# Patient Record
Sex: MALE | Race: WHITE | Employment: FULL TIME | URBAN - METROPOLITAN AREA
[De-identification: names, ages, dates, MRNs, and addresses within clinical notes are randomized per-mention and may not be internally consistent; named-entity substitution may affect disease eponyms.]

---

## 2022-11-01 ENCOUNTER — OFFICE VISIT (OUTPATIENT)
Dept: INTERNAL MEDICINE | Age: 32
End: 2022-11-01
Payer: COMMERCIAL

## 2022-11-01 VITALS
RESPIRATION RATE: 16 BRPM | BODY MASS INDEX: 29.41 KG/M2 | OXYGEN SATURATION: 99 % | HEART RATE: 94 BPM | SYSTOLIC BLOOD PRESSURE: 102 MMHG | WEIGHT: 166 LBS | DIASTOLIC BLOOD PRESSURE: 62 MMHG | HEIGHT: 63 IN | TEMPERATURE: 97.9 F

## 2022-11-01 DIAGNOSIS — B34.9 VIRAL ILLNESS: Primary | ICD-10-CM

## 2022-11-01 LAB
INFLUENZA A ANTIBODY: NORMAL
INFLUENZA B ANTIBODY: NORMAL
RSV ANTIGEN: NORMAL

## 2022-11-01 PROCEDURE — 87804 INFLUENZA ASSAY W/OPTIC: CPT | Performed by: PHYSICIAN ASSISTANT

## 2022-11-01 PROCEDURE — 99203 OFFICE O/P NEW LOW 30 MIN: CPT | Performed by: PHYSICIAN ASSISTANT

## 2022-11-01 PROCEDURE — 86756 RESPIRATORY VIRUS ANTIBODY: CPT | Performed by: PHYSICIAN ASSISTANT

## 2022-11-01 SDOH — ECONOMIC STABILITY: FOOD INSECURITY: WITHIN THE PAST 12 MONTHS, YOU WORRIED THAT YOUR FOOD WOULD RUN OUT BEFORE YOU GOT MONEY TO BUY MORE.: NEVER TRUE

## 2022-11-01 SDOH — ECONOMIC STABILITY: FOOD INSECURITY: WITHIN THE PAST 12 MONTHS, THE FOOD YOU BOUGHT JUST DIDN'T LAST AND YOU DIDN'T HAVE MONEY TO GET MORE.: NEVER TRUE

## 2022-11-01 ASSESSMENT — ENCOUNTER SYMPTOMS
RHINORRHEA: 0
COUGH: 1
SORE THROAT: 0
SINUS PAIN: 0
WHEEZING: 0
SHORTNESS OF BREATH: 0

## 2022-11-01 ASSESSMENT — PATIENT HEALTH QUESTIONNAIRE - PHQ9
SUM OF ALL RESPONSES TO PHQ9 QUESTIONS 1 & 2: 0
SUM OF ALL RESPONSES TO PHQ QUESTIONS 1-9: 0
1. LITTLE INTEREST OR PLEASURE IN DOING THINGS: 0
SUM OF ALL RESPONSES TO PHQ QUESTIONS 1-9: 0
2. FEELING DOWN, DEPRESSED OR HOPELESS: 0

## 2022-11-01 ASSESSMENT — SOCIAL DETERMINANTS OF HEALTH (SDOH): HOW HARD IS IT FOR YOU TO PAY FOR THE VERY BASICS LIKE FOOD, HOUSING, MEDICAL CARE, AND HEATING?: NOT HARD AT ALL

## 2022-11-01 NOTE — PROGRESS NOTES
22    Nubia Smith (: 1990) is a 28 y.o. male, New patient, here for evaluation of the following chief complaint(s):  New Patient and Headache (Lasted for the last 5 days coughing swollen lymphnodes, fever off and on 102)      HPI    Fever , HA and swollen lymph nodes x 5 days  States fever on and off, about twice daily   Up to 102 degrees  Cough, but no sore throat   Left sided neck pain, with the swollen nodes   Feels much better today , without the use of medications    Covid at home test x 3- negative   Kids have RSV         Review of Systems   Constitutional:  Positive for fever. HENT:  Negative for congestion, ear pain, postnasal drip, rhinorrhea, sinus pain and sore throat. Respiratory:  Positive for cough. Negative for shortness of breath and wheezing. Cardiovascular: Negative. Genitourinary:  Negative for decreased urine volume. Musculoskeletal:  Positive for neck pain (left sided). Negative for neck stiffness. Neurological:  Positive for headaches. Negative for dizziness, weakness and numbness. Prior to Visit Medications    Not on File        Allergies   Allergen Reactions    Pregabalin      Other reaction(s): Other: See Comments  Severe head aches.     Morphine Rash     Headcahes       Social History     Socioeconomic History    Marital status:      Spouse name: Not on file    Number of children: Not on file    Years of education: Not on file    Highest education level: Not on file   Occupational History    Not on file   Tobacco Use    Smoking status: Former     Packs/day: 1.00     Types: Cigarettes     Start date: 10/1/2008     Quit date: 10/25/2022     Years since quittin.0    Smokeless tobacco: Never   Substance and Sexual Activity    Alcohol use: Yes     Comment: occasional    Drug use: Never    Sexual activity: Not on file   Other Topics Concern    Not on file   Social History Narrative    Not on file     Social Determinants of Health     Financial Resource Strain: Low Risk     Difficulty of Paying Living Expenses: Not hard at all   Food Insecurity: No Food Insecurity    Worried About Running Out of Food in the Last Year: Never true    Ran Out of Food in the Last Year: Never true   Transportation Needs: Not on file   Physical Activity: Not on file   Stress: Not on file   Social Connections: Not on file   Intimate Partner Violence: Not on file   Housing Stability: Not on file       Vitals:    11/01/22 1426   BP: 102/62   Site: Left Upper Arm   Position: Sitting   Cuff Size: Medium Adult   Pulse: 94   Resp: 16   Temp: 97.9 °F (36.6 °C)   SpO2: 99%   Weight: 166 lb (75.3 kg)   Height: 5' 3\" (1.6 m)        Physical Exam  Vitals reviewed. Constitutional:       Appearance: Normal appearance. HENT:      Head: Normocephalic and atraumatic. Right Ear: Tympanic membrane normal.      Left Ear: Tympanic membrane normal.   Eyes:      Extraocular Movements: Extraocular movements intact. Conjunctiva/sclera: Conjunctivae normal.      Pupils: Pupils are equal, round, and reactive to light. Cardiovascular:      Rate and Rhythm: Normal rate and regular rhythm. Heart sounds: Normal heart sounds. Pulmonary:      Effort: Pulmonary effort is normal.      Breath sounds: Normal breath sounds. Abdominal:      General: Bowel sounds are normal.      Palpations: Abdomen is soft. Musculoskeletal:      Cervical back: Normal range of motion. Lymphadenopathy:      Cervical: Cervical adenopathy present. Neurological:      Mental Status: He is alert. ASSESSMENT/PLAN:  1. Viral illness  - POCT RSV- neg   - POCT Influenza A/B- neg   - getting better   - rest and fluids              No follow-ups on file. An  electronic signature was used to authenticate this note.     Electronically signed by HARMAN Ruiz on 11/1/2022 at 2:45 PM

## 2022-12-30 ENCOUNTER — OFFICE VISIT (OUTPATIENT)
Dept: INTERNAL MEDICINE | Age: 32
End: 2022-12-30

## 2022-12-30 VITALS
TEMPERATURE: 97.8 F | BODY MASS INDEX: 29.23 KG/M2 | WEIGHT: 165 LBS | DIASTOLIC BLOOD PRESSURE: 80 MMHG | RESPIRATION RATE: 18 BRPM | SYSTOLIC BLOOD PRESSURE: 128 MMHG | HEIGHT: 63 IN | OXYGEN SATURATION: 98 % | HEART RATE: 100 BPM

## 2022-12-30 DIAGNOSIS — J40 BRONCHITIS: Primary | ICD-10-CM

## 2022-12-30 RX ORDER — AZITHROMYCIN 250 MG/1
250 TABLET, FILM COATED ORAL SEE ADMIN INSTRUCTIONS
Qty: 6 TABLET | Refills: 0 | Status: SHIPPED | OUTPATIENT
Start: 2022-12-30 | End: 2023-01-04

## 2022-12-30 RX ORDER — METHYLPREDNISOLONE 4 MG/1
TABLET ORAL
Qty: 1 KIT | Refills: 0 | Status: SHIPPED | OUTPATIENT
Start: 2022-12-30 | End: 2023-01-05

## 2022-12-30 RX ORDER — ALBUTEROL SULFATE 90 UG/1
2 AEROSOL, METERED RESPIRATORY (INHALATION) 4 TIMES DAILY PRN
Qty: 18 G | Refills: 0 | Status: SHIPPED | OUTPATIENT
Start: 2022-12-30

## 2022-12-30 RX ORDER — BENZONATATE 100 MG/1
100 CAPSULE ORAL 3 TIMES DAILY PRN
Qty: 21 CAPSULE | Refills: 0 | Status: SHIPPED | OUTPATIENT
Start: 2022-12-30 | End: 2023-01-06

## 2022-12-30 ASSESSMENT — ENCOUNTER SYMPTOMS
RHINORRHEA: 1
TROUBLE SWALLOWING: 0
SINUS PRESSURE: 0
COUGH: 1
WHEEZING: 1
SORE THROAT: 1

## 2022-12-30 NOTE — PROGRESS NOTES
[facility] Encounter    SUBJECTIVE    CHIEF COMPLAINT:   Chief Complaint   Patient presents with    Illness     Patient started 3 weeks ago with sore throat, cough, and congestion. Patient kids had RSV a month ago. HPI:  Joao Carreon is a 28 y.o. male who presents to the walk-in clinic today for:     Cough  This is a new problem. Episode onset: x3 weeks. The problem has been unchanged. The cough is Productive of purulent sputum. Associated symptoms include rhinorrhea, a sore throat and wheezing. Pertinent negatives include no chest pain, ear pain, fever, headaches, myalgias or rash. Nothing aggravates the symptoms. Treatments tried: mucinex, motrin. The treatment provided mild relief. There is no history of asthma, bronchitis or COPD. + smoker . Family was sick at home during onset of symptoms with RSV. His symptoms have been lingering since. Past Medical History:   Diagnosis Date    ADHD (attention deficit hyperactivity disorder)     Chronic back pain        No current outpatient medications on file prior to visit. No current facility-administered medications on file prior to visit.        Family History   Problem Relation Age of Onset    Heart Attack Maternal Grandfather        Social History     Socioeconomic History    Marital status:      Spouse name: Not on file    Number of children: Not on file    Years of education: Not on file    Highest education level: Not on file   Occupational History    Not on file   Tobacco Use    Smoking status: Former     Packs/day: 1.00     Types: Cigarettes     Start date: 10/1/2008     Quit date: 10/25/2022     Years since quittin.1    Smokeless tobacco: Never   Substance and Sexual Activity    Alcohol use: Yes     Comment: occasional    Drug use: Never    Sexual activity: Not on file   Other Topics Concern    Not on file   Social History Narrative    Not on file     Social Determinants of Health Financial Resource Strain: Low Risk     Difficulty of Paying Living Expenses: Not hard at all   Food Insecurity: No Food Insecurity    Worried About Running Out of Food in the Last Year: Never true    Ran Out of Food in the Last Year: Never true   Transportation Needs: Not on file   Physical Activity: Not on file   Stress: Not on file   Social Connections: Not on file   Intimate Partner Violence: Not on file   Housing Stability: Not on file       Allergies   Allergen Reactions    Pregabalin      Other reaction(s): Other: See Comments  Severe head aches. Morphine Rash     Headcahes       Review of Systems   Constitutional:  Negative for fatigue and fever. HENT:  Positive for rhinorrhea and sore throat. Negative for congestion, ear pain, sinus pressure, sneezing and trouble swallowing. Respiratory:  Positive for cough and wheezing. Cardiovascular:  Negative for chest pain and palpitations. Musculoskeletal:  Negative for arthralgias and myalgias. Skin:  Negative for pallor and rash. Neurological:  Negative for headaches. Psychiatric/Behavioral:  Positive for sleep disturbance. OBJECTIVE:  VITALS:  /80 (Site: Left Upper Arm, Position: Sitting, Cuff Size: Small Adult)   Pulse 100   Temp 97.8 °F (36.6 °C) (Temporal)   Resp 18   Ht 5' 3\" (1.6 m)   Wt 165 lb (74.8 kg)   SpO2 98%   BMI 29.23 kg/m²     Physical Exam  Vitals reviewed. Constitutional:       Appearance: Normal appearance. He is not ill-appearing. HENT:      Head: Normocephalic. Right Ear: Tympanic membrane, ear canal and external ear normal. There is no impacted cerumen. Left Ear: Tympanic membrane, ear canal and external ear normal. There is no impacted cerumen. Nose: No congestion. Mouth/Throat:      Mouth: Mucous membranes are moist.      Pharynx: Oropharynx is clear. Posterior oropharyngeal erythema present. No oropharyngeal exudate.    Cardiovascular:      Rate and Rhythm: Normal rate and regular rhythm. Pulses: Normal pulses. Heart sounds: Normal heart sounds. No murmur heard. No friction rub. No gallop. Pulmonary:      Effort: Pulmonary effort is normal.      Breath sounds: Wheezing present. Musculoskeletal:      Cervical back: Normal range of motion and neck supple. No rigidity. Lymphadenopathy:      Cervical: No cervical adenopathy. Skin:     General: Skin is warm and dry. Coloration: Skin is not jaundiced or pale. Findings: No rash. Neurological:      Mental Status: He is alert and oriented to person, place, and time. ASSESSMENT/PLAN:    1. Bronchitis  - methylPREDNISolone (MEDROL DOSEPACK) 4 MG tablet; Take by mouth. Dispense: 1 kit; Refill: 0  - benzonatate (TESSALON) 100 MG capsule; Take 1 capsule by mouth 3 times daily as needed for Cough  Dispense: 21 capsule; Refill: 0  - albuterol sulfate HFA (VENTOLIN HFA) 108 (90 Base) MCG/ACT inhaler; Inhale 2 puffs into the lungs 4 times daily as needed for Wheezing  Dispense: 18 g; Refill: 0  - azithromycin (ZITHROMAX) 250 MG tablet; Take 1 tablet by mouth See Admin Instructions for 5 days 500mg on day 1 followed by 250mg on days 2 - 5  Dispense: 6 tablet; Refill: 0  - OTC tylenol/motrin for pain/fevers  - Increase fluids + rest    LABS:  No results found for this visit on 12/30/22. Return in about 2 weeks (around 1/13/2023) for reassessment with PCP. Follow up with PCP to evaluate treatment results or return if symptoms worsen or fail to improve. Discussed signs and symptoms which require immediate follow-up in ED/call to 911. Understanding verbalized. All prescribed medication today including purpose, proper use, and side effects discussed. Treatment plan/rationale and result expectations have been discussed with the patient who expresses understanding and desires to proceed. An electronic signature was used to authenticate this note.   ARTHUR Motta - CNP

## 2023-02-09 ENCOUNTER — HOSPITAL ENCOUNTER (EMERGENCY)
Age: 33
Discharge: HOME | End: 2023-02-09
Payer: COMMERCIAL

## 2023-02-09 VITALS
WEIGHT: 165 LBS | TEMPERATURE: 98 F | OXYGEN SATURATION: 100 % | RESPIRATION RATE: 16 BRPM | BODY MASS INDEX: 29.23 KG/M2 | HEART RATE: 95 BPM | SYSTOLIC BLOOD PRESSURE: 120 MMHG | DIASTOLIC BLOOD PRESSURE: 77 MMHG

## 2023-02-09 DIAGNOSIS — H66.001 ACUTE SUPPURATIVE OTITIS MEDIA OF RIGHT EAR WITHOUT SPONTANEOUS RUPTURE OF TYMPANIC MEMBRANE, RECURRENCE NOT SPECIFIED: Primary | ICD-10-CM

## 2023-02-09 LAB
INFLUENZA A BY PCR: NEGATIVE
INFLUENZA B BY PCR: NEGATIVE
SARS-COV-2, NAAT: NOT DETECTED

## 2023-02-09 PROCEDURE — 96372 THER/PROPH/DIAG INJ SC/IM: CPT

## 2023-02-09 PROCEDURE — 87502 INFLUENZA DNA AMP PROBE: CPT

## 2023-02-09 PROCEDURE — 99284 EMERGENCY DEPT VISIT MOD MDM: CPT

## 2023-02-09 PROCEDURE — 6360000002 HC RX W HCPCS

## 2023-02-09 PROCEDURE — 6370000000 HC RX 637 (ALT 250 FOR IP)

## 2023-02-09 PROCEDURE — 87635 SARS-COV-2 COVID-19 AMP PRB: CPT

## 2023-02-09 RX ORDER — KETOROLAC TROMETHAMINE 30 MG/ML
30 INJECTION, SOLUTION INTRAMUSCULAR; INTRAVENOUS ONCE
Status: COMPLETED | OUTPATIENT
Start: 2023-02-09 | End: 2023-02-09

## 2023-02-09 RX ORDER — AMOXICILLIN AND CLAVULANATE POTASSIUM 875; 125 MG/1; MG/1
1 TABLET, FILM COATED ORAL EVERY 12 HOURS SCHEDULED
Status: DISCONTINUED | OUTPATIENT
Start: 2023-02-09 | End: 2023-02-09

## 2023-02-09 RX ORDER — HYDROCODONE BITARTRATE AND ACETAMINOPHEN 5; 325 MG/1; MG/1
1 TABLET ORAL ONCE
Status: COMPLETED | OUTPATIENT
Start: 2023-02-09 | End: 2023-02-09

## 2023-02-09 RX ORDER — AMOXICILLIN AND CLAVULANATE POTASSIUM 875; 125 MG/1; MG/1
1 TABLET, FILM COATED ORAL 2 TIMES DAILY
Qty: 20 TABLET | Refills: 0 | Status: SHIPPED | OUTPATIENT
Start: 2023-02-09 | End: 2023-02-19

## 2023-02-09 RX ORDER — AMOXICILLIN AND CLAVULANATE POTASSIUM 875; 125 MG/1; MG/1
1 TABLET, FILM COATED ORAL ONCE
Status: COMPLETED | OUTPATIENT
Start: 2023-02-09 | End: 2023-02-09

## 2023-02-09 RX ORDER — IBUPROFEN 600 MG/1
600 TABLET ORAL EVERY 8 HOURS PRN
Qty: 20 TABLET | Refills: 0 | Status: SHIPPED | OUTPATIENT
Start: 2023-02-09

## 2023-02-09 RX ADMIN — AMOXICILLIN AND CLAVULANATE POTASSIUM 1 TABLET: 875; 125 TABLET, FILM COATED ORAL at 19:06

## 2023-02-09 RX ADMIN — KETOROLAC TROMETHAMINE 30 MG: 30 INJECTION, SOLUTION INTRAMUSCULAR at 19:06

## 2023-02-09 RX ADMIN — HYDROCODONE BITARTRATE AND ACETAMINOPHEN 1 TABLET: 5; 325 TABLET ORAL at 19:35

## 2023-02-09 ASSESSMENT — ENCOUNTER SYMPTOMS
NAUSEA: 0
VOMITING: 0
COUGH: 0
DIARRHEA: 0
SORE THROAT: 0
BACK PAIN: 0
ABDOMINAL PAIN: 0
SHORTNESS OF BREATH: 0

## 2023-02-09 ASSESSMENT — PAIN SCALES - GENERAL: PAINLEVEL_OUTOF10: 9

## 2023-02-09 ASSESSMENT — PAIN - FUNCTIONAL ASSESSMENT: PAIN_FUNCTIONAL_ASSESSMENT: 0-10

## 2023-02-09 ASSESSMENT — PAIN DESCRIPTION - ORIENTATION: ORIENTATION: RIGHT

## 2023-02-09 NOTE — Clinical Note
Donta Rosen was seen and treated in our emergency department on 2/9/2023. He may return to work on 02/11/2023. If you have any questions or concerns, please don't hesitate to call.       Roshan Benitez, APRN - CNP

## 2023-02-09 NOTE — ED PROVIDER NOTES
[address] [place] [address] ED  eMERGENCYdEPARTMENT eNCOUnter      Pt Name: Bandar Johnson  MRN: [id number]  Vandanagfconor 1990of evaluation: 2/9/2023  [address] [place] [address]    CHIEF COMPLAINT       Chief Complaint   Patient presents with    Otalgia     C/o right ear pain x 1 day           HISTORY OF PRESENT ILLNESS  (Location/Symptom, Timing/Onset, Context/Setting, Quality, Duration, Modifying Factors, Severity.)   Bandar Johnson is a 28 y.o. male hx of ADHD and smoker who presents to the emergency department with otalgia x 2 days. Patient complains of right ear ache 9/10 unrelieved by OTC Motrin x 2 days. Denies any CP, SOB, fever, chills, recent illness, nausea, emesis, abdominal pain headache. HPI    Nursing Notes were reviewed and I agree. REVIEW OF SYSTEMS    (2-9 systems for level 4, 10 or more for level 5)     Review of Systems   Constitutional:  Negative for activity change, chills and fever. HENT:  Positive for ear pain (right ear pain). Negative for sore throat. Eyes:  Negative for visual disturbance. Respiratory:  Negative for cough and shortness of breath. Cardiovascular:  Negative for chest pain, palpitations and leg swelling. Gastrointestinal:  Negative for abdominal pain, diarrhea, nausea and vomiting. Genitourinary:  Negative for dysuria. Musculoskeletal:  Negative for back pain. Skin:  Negative for rash. Neurological:  Negative for dizziness and weakness. as noted above the remainder of the review of systems was reviewed and negative.        PAST MEDICAL HISTORY     Past Medical History:   Diagnosis Date    ADHD (attention deficit hyperactivity disorder)     Chronic back pain          SURGICAL HISTORY       Past Surgical History:   Procedure Laterality Date    APPENDECTOMY      BACK SURGERY           CURRENT MEDICATIONS       Previous Medications    ALBUTEROL SULFATE HFA (VENTOLIN HFA) 108 (90 BASE) MCG/ACT INHALER    Inhale 2 puffs into the lungs 4 times daily as needed for Wheezing       ALLERGIES     Pregabalin and Morphine    HISTORY       Family History   Problem Relation Age of Onset    Heart Attack Maternal Grandfather           SOCIAL HISTORY       Social History     Socioeconomic History    Marital status:    Tobacco Use    Smoking status: Former     Packs/day: 1.00     Types: Cigarettes     Start date: 10/1/2008     Quit date: 10/25/2022     Years since quittin.2    Smokeless tobacco: Never   Substance and Sexual Activity    Alcohol use: Yes     Comment: occasional    Drug use: Never     Social Determinants of Health     Financial Resource Strain: Low Risk     Difficulty of Paying Living Expenses: Not hard at all   Food Insecurity: No Food Insecurity    Worried About [employer] in the Last Year: Never true    Ran Out of Food in the Last Year: Never true       SCREENINGS    [place] Coma Scale  Eye Opening: Spontaneous  Best Verbal Response: Oriented  Best Motor Response: Obeys commands  [place] Coma Scale Score: 15      PHYSICAL EXAM    (up to 7 forlevel 4, 8 or more for level 5)     ED Triage Vitals [23 1758]   BP Temp Temp src Heart Rate Resp SpO2 Height Weight   (!) 142/95 -- -- 93 18 99 % -- --       Physical Exam  Vitals and nursing note reviewed. Constitutional:       General: He is not in acute distress. Appearance: He is well-developed. He is not ill-appearing. HENT:      Head: Normocephalic. Right Ear: External ear normal. Tenderness present. Tympanic membrane is erythematous and bulging. Left Ear: Hearing, tympanic membrane, ear canal and external ear normal.      Nose: Nose normal. No congestion or rhinorrhea. Mouth/Throat:      Mouth: Mucous membranes are moist.      Pharynx: No oropharyngeal exudate or posterior oropharyngeal erythema. Eyes:      Conjunctiva/sclera: Conjunctivae normal.      Pupils: Pupils are equal, round, and reactive to light.    Cardiovascular:      Rate and Rhythm: Normal rate and regular rhythm. Heart sounds: Normal heart sounds. Pulmonary:      Effort: Pulmonary effort is normal.      Breath sounds: Normal breath sounds. No wheezing or rhonchi. Abdominal:      General: Bowel sounds are normal. There is no distension. Palpations: Abdomen is soft. Tenderness: There is no abdominal tenderness. Musculoskeletal:         General: Normal range of motion. Cervical back: Normal range of motion and neck supple. Lymphadenopathy:      Cervical: No cervical adenopathy. Skin:     General: Skin is warm and dry. Capillary Refill: Capillary refill takes less than 2 seconds. Neurological:      General: No focal deficit present. Mental Status: He is alert and oriented to person, place, and time. Mental status is at baseline. Psychiatric:         Mood and Affect: Mood normal.         Behavior: Behavior normal.         Thought Content: Thought content normal.         Judgment: Judgment normal.         DIAGNOSTIC RESULTS     RADIOLOGY:   Non-plain film images such as CT, Ultrasound and MRI are read by the radiologist. Plain radiographic images are visualized and preliminarilyinterpreted by ARTHUR Flynn CNP with the below findings:        Interpretation per the Radiologist below, if available at the time of this note:    No orders to display       LABS:  Labs Reviewed   COVID-19, RAPID   RAPID INFLUENZA A/B ANTIGENS       All other labs were within normal range or not returnedas of this dictation.     EMERGENCYDEPARTMENT COURSE and DIFFERENTIAL DIAGNOSIS/MDM:   Vitals:    Vitals:    02/09/23 1758 02/09/23 1907 02/09/23 1912   BP: (!) 142/95  120/77   Pulse: 93  95   Resp: 18  16   Temp:  98 °F (36.7 °C)    SpO2: 99%  100%   Weight:  165 lb (74.8 kg)        REASSESSMENT            MDM     Amount and/or Complexity of Data Reviewed  Clinical lab tests: ordered and reviewed    Risk of Complications, Morbidity, and/or Mortality  Presenting problems: low  Diagnostic procedures: low  Management options: low    Patient Progress  Patient progress: stable  SC 28year old male presents to ED for otalgia. Patient afebrile and hemodynamically stable. Medicated with Toradol IM and Augmentin po. Suspect right otitis media. Pain slightly improved post medication, Norco po given. Rx Augmentin and Motrin given. Discussed Dx, Tx, Rx, follow up, reasons to return to ED for further treatment patient states understanding and denies any questions. PROCEDURES:    Procedures      FINAL IMPRESSION      1.  Acute suppurative otitis media of right ear without spontaneous rupture of tympanic membrane, recurrence not specified Stable         DISPOSITION/PLAN   DISPOSITION Decision To Discharge 02/09/2023 07:21:53 PM      PATIENT REFERRED TO:  Cheryl Dillard [address] [place] [address], [address] [phone]    In 2 days      [facility] ED  [address] [facility]  [phone]    If symptoms worsen    DISCHARGE MEDICATIONS:  New Prescriptions    AMOXICILLIN-CLAVULANATE (AUGMENTIN) 875-125 MG PER TABLET    Take 1 tablet by mouth 2 times daily for 10 days    IBUPROFEN (IBU) 600 MG TABLET    Take 1 tablet by mouth every 8 hours as needed for Pain or Fever       (Please note that portions of this note were completed with a voice recognition program.  Efforts were made to edit the dictations but occasionally words are mis-transcribed.)    ARTHUR Tate - ARTHUR Shaw - CNP  02/09/23 [address] [place] [address]  02/09/23 1936

## 2023-02-10 ENCOUNTER — HOSPITAL ENCOUNTER (EMERGENCY)
Age: 33
Discharge: HOME | End: 2023-02-10
Attending: EMERGENCY MEDICINE | Admitting: EMERGENCY MEDICINE
Payer: COMMERCIAL

## 2023-02-10 VITALS
BODY MASS INDEX: 29.23 KG/M2 | HEIGHT: 63 IN | DIASTOLIC BLOOD PRESSURE: 101 MMHG | HEART RATE: 124 BPM | OXYGEN SATURATION: 99 % | TEMPERATURE: 98.6 F | WEIGHT: 165 LBS | RESPIRATION RATE: 16 BRPM | SYSTOLIC BLOOD PRESSURE: 151 MMHG

## 2023-02-10 DIAGNOSIS — H67.1 OTITIS MEDIA OF RIGHT EAR IN DISEASE CLASSIFIED ELSEWHERE: Primary | ICD-10-CM

## 2023-02-10 PROCEDURE — 99283 EMERGENCY DEPT VISIT LOW MDM: CPT

## 2023-02-10 PROCEDURE — 6370000000 HC RX 637 (ALT 250 FOR IP): Performed by: EMERGENCY MEDICINE

## 2023-02-10 RX ORDER — AMOXICILLIN AND CLAVULANATE POTASSIUM 875; 125 MG/1; MG/1
1 TABLET, FILM COATED ORAL EVERY 12 HOURS SCHEDULED
Status: DISCONTINUED | OUTPATIENT
Start: 2023-02-10 | End: 2023-02-10 | Stop reason: HOSPADM

## 2023-02-10 RX ORDER — HYDROCODONE BITARTRATE AND ACETAMINOPHEN 5; 325 MG/1; MG/1
1 TABLET ORAL ONCE
Status: COMPLETED | OUTPATIENT
Start: 2023-02-10 | End: 2023-02-10

## 2023-02-10 RX ORDER — HYDROCODONE BITARTRATE AND ACETAMINOPHEN 5; 325 MG/1; MG/1
1 TABLET ORAL EVERY 6 HOURS PRN
Qty: 10 TABLET | Refills: 0 | Status: SHIPPED | OUTPATIENT
Start: 2023-02-10 | End: 2023-02-13

## 2023-02-10 RX ADMIN — HYDROCODONE BITARTRATE AND ACETAMINOPHEN 1 TABLET: 5; 325 TABLET ORAL at 04:23

## 2023-02-10 RX ADMIN — AMOXICILLIN AND CLAVULANATE POTASSIUM 1 TABLET: 875; 125 TABLET, FILM COATED ORAL at 04:23

## 2023-02-10 ASSESSMENT — PAIN DESCRIPTION - DESCRIPTORS: DESCRIPTORS: SHARP;THROBBING

## 2023-02-10 ASSESSMENT — PAIN SCALES - GENERAL: PAINLEVEL_OUTOF10: 10

## 2023-02-10 ASSESSMENT — PAIN DESCRIPTION - LOCATION: LOCATION: EAR

## 2023-02-10 ASSESSMENT — PAIN DESCRIPTION - ORIENTATION: ORIENTATION: RIGHT

## 2023-02-10 ASSESSMENT — PAIN - FUNCTIONAL ASSESSMENT: PAIN_FUNCTIONAL_ASSESSMENT: 0-10

## 2023-02-10 ASSESSMENT — PAIN DESCRIPTION - PAIN TYPE: TYPE: ACUTE PAIN

## 2023-02-10 NOTE — ED PROVIDER NOTES
CC/HPI: 70-year-old male to the emergency department chief complaint of right ear discomfort. Patient was seen here yesterday evening. Patient diagnosed with right otitis media. Patient states that the medications he was given in the emergency department which included p.o. Augmentin IM Toradol and a p.o. [place] helped for his pain. However he woke up approximately 2 hours ago and the pain was worse. Patient states he tried taking ibuprofen however that did not help so he came to the emergency department. Patient states it has been draining for the last 2 days. He has not had a fever. Patient states it is giving him a headache. He has had mild URI symptoms over the last several days. Patient states family members sick with similar URI symptoms. States he is not prone to getting ear infections. VITALS/PMH/PSH: Reviewed per nurses notes    REVIEW OF SYSTEMS: As in chief complaint history of present illness, otherwise all other systems are reviewed and negative the total 10 systems reviewed    PHYSICAL EXAM:  GEN: Pt alert and oriented, no acute distress. Appears uncomfortable at times  HEENT:         Normocephalic/Atramatic        PERRL, EOMI       EACs clear bilaterally. Small amount of clear effusion left tympanic membrane without erythema. Right tympanic membrane moderately erythematous and bulging. TM appears to be intact. White effusion noted. No tenderness with manipulation of external ear. No tenderness at mastoid process or swelling or redness. Throat non-edematous. No erythema noted. No exudates noted. Moist membranes  NECK: Nontender, no signs of trauma, no lymphadenopathy  HEART: Reg S1/S2, without murmer, rub or gallop  LUNGS: Clear to auscultation bilaterally, respirations even and unlabored  LYMPH: no peripheral lympadenopathy noted  SKIN:  Warm & dry, no rash  NEUROLOGIC:  Alert and oriented.   Speech clear    Medical decision making/ED course;  Patient remained stable throughout the course of his emergency department stay. Patient was given 875 mg of p.o. Augmentin. Was also given a [place] in the emergency department. Final Clinical impression;  1) acute right otitis media    Disposition/plan; patient discharged home in stable condition given discharge instructions on otitis media. Patient given referral to ENT. Discussed the possibility of his tympanic membrane rupturing or having a small rupture that I did not visualize. Discussed with patient will prescribe small amount of Norco to go home with. Encouraged alternate with ibuprofen. Encouraged to return for the emergency department for any worsening and or changes to symptoms if he is not able to get into ENT. Patient voiced understanding and agreement with treatment plan.      Tony Branham,   02/10/23 0422

## 2023-02-17 ENCOUNTER — OFFICE VISIT (OUTPATIENT)
Dept: INTERNAL MEDICINE | Age: 33
End: 2023-02-17
Payer: COMMERCIAL

## 2023-02-17 VITALS
TEMPERATURE: 97.2 F | RESPIRATION RATE: 16 BRPM | WEIGHT: 174 LBS | OXYGEN SATURATION: 98 % | DIASTOLIC BLOOD PRESSURE: 84 MMHG | BODY MASS INDEX: 30.83 KG/M2 | HEART RATE: 84 BPM | HEIGHT: 63 IN | SYSTOLIC BLOOD PRESSURE: 122 MMHG

## 2023-02-17 DIAGNOSIS — H66.001 NON-RECURRENT ACUTE SUPPURATIVE OTITIS MEDIA OF RIGHT EAR WITHOUT SPONTANEOUS RUPTURE OF TYMPANIC MEMBRANE: Primary | ICD-10-CM

## 2023-02-17 PROCEDURE — 99213 OFFICE O/P EST LOW 20 MIN: CPT | Performed by: PHYSICIAN ASSISTANT

## 2023-02-17 RX ORDER — CEFUROXIME AXETIL 500 MG/1
500 TABLET ORAL 2 TIMES DAILY
Qty: 14 TABLET | Refills: 0 | Status: SHIPPED | OUTPATIENT
Start: 2023-02-17 | End: 2023-02-24

## 2023-02-17 SDOH — ECONOMIC STABILITY: INCOME INSECURITY: HOW HARD IS IT FOR YOU TO PAY FOR THE VERY BASICS LIKE FOOD, HOUSING, MEDICAL CARE, AND HEATING?: NOT HARD AT ALL

## 2023-02-17 SDOH — ECONOMIC STABILITY: FOOD INSECURITY: WITHIN THE PAST 12 MONTHS, THE FOOD YOU BOUGHT JUST DIDN'T LAST AND YOU DIDN'T HAVE MONEY TO GET MORE.: NEVER TRUE

## 2023-02-17 SDOH — ECONOMIC STABILITY: HOUSING INSECURITY
IN THE LAST 12 MONTHS, WAS THERE A TIME WHEN YOU DID NOT HAVE A STEADY PLACE TO SLEEP OR SLEPT IN A SHELTER (INCLUDING NOW)?: NO

## 2023-02-17 SDOH — ECONOMIC STABILITY: FOOD INSECURITY: WITHIN THE PAST 12 MONTHS, YOU WORRIED THAT YOUR FOOD WOULD RUN OUT BEFORE YOU GOT MONEY TO BUY MORE.: NEVER TRUE

## 2023-02-17 ASSESSMENT — ENCOUNTER SYMPTOMS
COUGH: 0
DIARRHEA: 0
RHINORRHEA: 0

## 2023-02-17 ASSESSMENT — PATIENT HEALTH QUESTIONNAIRE - PHQ9
SUM OF ALL RESPONSES TO PHQ9 QUESTIONS 1 & 2: 0
1. LITTLE INTEREST OR PLEASURE IN DOING THINGS: 0
2. FEELING DOWN, DEPRESSED OR HOPELESS: 0
SUM OF ALL RESPONSES TO PHQ QUESTIONS 1-9: 0

## 2023-02-17 NOTE — PROGRESS NOTES
John Navarro (: 1990) is a 28 y.o. male, Established patient, here for evaluation of the following chief complaint(s):  Otalgia (Right ear pain and pressure. Went to ER on 2/10 said he had an ear infection. Pressure pain is 7. Has a lot of ringing in ears)        ASSESSMENT/PLAN:  1. Non-recurrent acute suppurative otitis media of right ear without spontaneous rupture of tympanic membrane  - OM persist on Augmentin, switched to Ceftin   - continue Motrin for pain, add flonase for symptoms   - cefUROXime (CEFTIN) 500 MG tablet; Take 1 tablet by mouth 2 times daily for 7 days  Dispense: 14 tablet; Refill: 0      No follow-ups on file. SUBJECTIVE/OBJECTIVE:  Otalgia   There is pain in the right ear. This is a new problem. The current episode started in the past 7 days. The problem occurs constantly. The problem has been gradually worsening. There has been no fever. The pain is moderate. Pertinent negatives include no coughing, diarrhea, headaches or rhinorrhea. He has tried antibiotics for the symptoms. Treated in the ER on , still on Augmentin and Motrin   Still having pain 7/10      Review of Systems   HENT:  Positive for ear pain. Negative for rhinorrhea. Respiratory:  Negative for cough. Gastrointestinal:  Negative for diarrhea. Neurological:  Negative for headaches. Physical Exam  Vitals reviewed. Constitutional:       Appearance: Normal appearance. HENT:      Head: Normocephalic. Right Ear: No drainage. There is no impacted cerumen. No mastoid tenderness. Tympanic membrane is erythematous. Tympanic membrane is not perforated. Left Ear: Tympanic membrane normal.   Musculoskeletal:      Cervical back: Full passive range of motion without pain. Neurological:      Mental Status: He is alert.        Vitals:    23 0751   BP: 122/84   Site: Left Upper Arm   Position: Sitting   Cuff Size: Medium Adult   Pulse: 84   Resp: 16   Temp: 97.2 °F (36.2 °C)   SpO2: 98%   Weight: 174 lb (78.9 kg)   Height: 5' 3\" (1.6 m)                 An electronic signature was used to authenticate this note.     --HARMAN Pacheco

## 2023-07-07 ENCOUNTER — OFFICE VISIT (OUTPATIENT)
Dept: INTERNAL MEDICINE | Age: 33
End: 2023-07-07
Payer: COMMERCIAL

## 2023-07-07 VITALS
WEIGHT: 171 LBS | SYSTOLIC BLOOD PRESSURE: 122 MMHG | HEART RATE: 101 BPM | HEIGHT: 63 IN | RESPIRATION RATE: 16 BRPM | OXYGEN SATURATION: 98 % | TEMPERATURE: 97 F | BODY MASS INDEX: 30.3 KG/M2 | DIASTOLIC BLOOD PRESSURE: 78 MMHG

## 2023-07-07 DIAGNOSIS — L72.9 SKIN CYST: ICD-10-CM

## 2023-07-07 DIAGNOSIS — Z00.00 ENCOUNTER FOR WELL ADULT EXAM WITHOUT ABNORMAL FINDINGS: Primary | ICD-10-CM

## 2023-07-07 DIAGNOSIS — Z87.891 PERSONAL HISTORY OF TOBACCO USE, PRESENTING HAZARDS TO HEALTH: ICD-10-CM

## 2023-07-07 DIAGNOSIS — Z13.1 SCREENING FOR DIABETES MELLITUS: ICD-10-CM

## 2023-07-07 DIAGNOSIS — Z13.220 NEED FOR LIPID SCREENING: ICD-10-CM

## 2023-07-07 PROCEDURE — 99406 BEHAV CHNG SMOKING 3-10 MIN: CPT | Performed by: PHYSICIAN ASSISTANT

## 2023-07-07 PROCEDURE — 99395 PREV VISIT EST AGE 18-39: CPT | Performed by: PHYSICIAN ASSISTANT

## 2023-07-07 RX ORDER — VARENICLINE TARTRATE 1 MG/1
1 TABLET, FILM COATED ORAL 2 TIMES DAILY
Qty: 180 TABLET | Refills: 1 | Status: SHIPPED | OUTPATIENT
Start: 2023-07-07

## 2023-07-07 RX ORDER — VARENICLINE TARTRATE 0.5 MG/1
TABLET, FILM COATED ORAL
Qty: 57 TABLET | Refills: 0 | Status: SHIPPED | OUTPATIENT
Start: 2023-07-07

## 2023-07-07 NOTE — PROGRESS NOTES
varenicline (CHANTIX CONTINUING MONTH BRUNA) 1 MG tablet; Take 1 tablet by mouth 2 times daily, Disp-180 tablet, R-1Normal    5. Skin cyst  -     Ambulatory referral to Dermatology         Personalized Preventive Plan   Current Health Maintenance Status  Immunization History   Administered Date(s) Administered    COVID-19, [place] Vaccine, Vaccine Unspecified 04/06/2021, 05/06/2021    DTP 1990, 1990, 1990    DTaP vaccine 09/18/1991, 03/18/1995    Hep B, ENGERIX-B, RECOMBIVAX-HB, (age Birth - 22y), IM, 0.5mL 08/14/2003, 02/17/2004    Hib vaccine 07/10/1991    Influenza, FLUARIX, FLULAVAL, Valeta Sjogren (age 10 mo+) AND AFLURIA, (age 1 y+), PF, 0.5mL 10/06/2022    MMR, Debbie Noss, M-M-R II, (age 12m+), SC, 0.5mL 07/10/1991    Polio OPV 1990, 1990, 07/10/1991, 03/18/1995        Health Maintenance   Topic Date Due    Varicella vaccine (1 of 2 - 2-dose childhood series) Never done    DTaP/Tdap/Td vaccine (6 - Tdap) 02/25/2001    HIV screen  Never done    Hepatitis C screen  Never done    COVID-19 Vaccine (3 - Booster) 07/01/2021    Flu vaccine (1) 08/01/2023    Depression Screen  02/17/2024    Hib vaccine  Completed    Hepatitis A vaccine  Aged Out    Meningococcal (ACWY) vaccine  Aged Out    Pneumococcal 0-64 years Vaccine  Aged Out     Recommendations for [employer] Due: see orders and patient instructions/AVS.    Return in 1 year (on 7/7/2024). Tobacco Cessation Counseling: Patient advised about behavior change, including information about personal health harms, usage of appropriate cessation measures and benefits of cessation.   Time spent (minutes): 5

## 2024-02-07 ENCOUNTER — OFFICE VISIT (OUTPATIENT)
Dept: INTERNAL MEDICINE | Age: 34
End: 2024-02-07
Payer: COMMERCIAL

## 2024-02-07 VITALS
HEART RATE: 97 BPM | BODY MASS INDEX: 28.17 KG/M2 | DIASTOLIC BLOOD PRESSURE: 86 MMHG | SYSTOLIC BLOOD PRESSURE: 132 MMHG | OXYGEN SATURATION: 99 % | WEIGHT: 159 LBS | HEIGHT: 63 IN

## 2024-02-07 DIAGNOSIS — L98.9 SKIN LESION: Primary | ICD-10-CM

## 2024-02-07 PROCEDURE — 99213 OFFICE O/P EST LOW 20 MIN: CPT | Performed by: FAMILY MEDICINE

## 2024-02-07 ASSESSMENT — PATIENT HEALTH QUESTIONNAIRE - PHQ9
2. FEELING DOWN, DEPRESSED OR HOPELESS: 0
SUM OF ALL RESPONSES TO PHQ QUESTIONS 1-9: 0
SUM OF ALL RESPONSES TO PHQ9 QUESTIONS 1 & 2: 0
SUM OF ALL RESPONSES TO PHQ QUESTIONS 1-9: 0
SUM OF ALL RESPONSES TO PHQ QUESTIONS 1-9: 0
SUM OF ALL RESPONSES TO PHQ9 QUESTIONS 1 & 2: 0
SUM OF ALL RESPONSES TO PHQ QUESTIONS 1-9: 0
2. FEELING DOWN, DEPRESSED OR HOPELESS: NOT AT ALL
1. LITTLE INTEREST OR PLEASURE IN DOING THINGS: 0
1. LITTLE INTEREST OR PLEASURE IN DOING THINGS: NOT AT ALL

## 2024-02-07 ASSESSMENT — ENCOUNTER SYMPTOMS
ABDOMINAL PAIN: 0
COUGH: 0
RHINORRHEA: 0
DIARRHEA: 0
SORE THROAT: 0
CONSTIPATION: 0
WHEEZING: 0
SHORTNESS OF BREATH: 0
COLOR CHANGE: 1

## 2024-02-07 NOTE — PROGRESS NOTES
General: He is not in acute distress.     Appearance: He is well-developed.   HENT:      Head: Normocephalic and atraumatic.   Cardiovascular:      Rate and Rhythm: Normal rate.   Pulmonary:      Effort: Pulmonary effort is normal. No respiratory distress.   Musculoskeletal:      Cervical back: Normal range of motion.   Skin:     General: Skin is warm and dry.   Neurological:      Mental Status: He is alert and oriented to person, place, and time.   Psychiatric:         Behavior: Behavior normal.         Assessment/Plan:  33 y.o. male here mainly for skin lesions:  - has multiple skin lesions over the body; mix of normal moles, birthmarks; L hip and thigh have nodular lesions; R elbow probably with seborrheic keratosis. Sending to derm for opinion     Diagnosis Orders   1. Skin lesion  [facility] - Arnulfo Sheikh MD, Dermatology, [facility]           Return if symptoms worsen or fail to improve.    Juancarlos Gómez MD

## 2025-05-20 ENCOUNTER — OFFICE VISIT (OUTPATIENT)
Dept: ORTHOPEDIC SURGERY | Facility: CLINIC | Age: 35
End: 2025-05-20
Payer: COMMERCIAL

## 2025-05-20 DIAGNOSIS — G56.03 BILATERAL CARPAL TUNNEL SYNDROME: ICD-10-CM

## 2025-05-20 DIAGNOSIS — G56.23 CUBITAL TUNNEL SYNDROME OF BOTH UPPER EXTREMITIES: Primary | ICD-10-CM

## 2025-05-20 PROCEDURE — 99202 OFFICE O/P NEW SF 15 MIN: CPT | Performed by: ORTHOPAEDIC SURGERY

## 2025-05-20 PROCEDURE — 99215 OFFICE O/P EST HI 40 MIN: CPT | Performed by: ORTHOPAEDIC SURGERY

## 2025-05-20 PROCEDURE — 20526 THER INJECTION CARP TUNNEL: CPT | Mod: LT | Performed by: ORTHOPAEDIC SURGERY

## 2025-05-20 PROCEDURE — 20526 THER INJECTION CARP TUNNEL: CPT | Performed by: ORTHOPAEDIC SURGERY

## 2025-05-20 PROCEDURE — 2500000004 HC RX 250 GENERAL PHARMACY W/ HCPCS (ALT 636 FOR OP/ED): Performed by: ORTHOPAEDIC SURGERY

## 2025-05-20 RX ORDER — LIDOCAINE HYDROCHLORIDE 10 MG/ML
1 INJECTION, SOLUTION INFILTRATION; PERINEURAL
Status: COMPLETED | OUTPATIENT
Start: 2025-05-20 | End: 2025-05-20

## 2025-05-20 RX ADMIN — LIDOCAINE HYDROCHLORIDE 1 ML: 10 INJECTION, SOLUTION INFILTRATION; PERINEURAL at 15:25

## 2025-05-20 RX ADMIN — TRIAMCINOLONE ACETONIDE 10 MG: 10 INJECTION, SUSPENSION INTRA-ARTICULAR; INTRALESIONAL at 15:25

## 2025-05-20 NOTE — PROGRESS NOTES
5/20/2025    Chief Complaint   Patient presents with    Right Hand - Pain, Numbness, Tingling    Left Hand - Pain, Numbness, Tingling       History of Present Illness:  Patient Delfino Salazar , 35 y.o. male, presents today, 5/20/2025, for evaluation of bilateral hand, wrist, and forearm pain, numbness, and weakness.  Symptoms are slightly worse in the right comparison the left.  He is right-hand dominant dividual.  He works as a .  He underwent bilateral diagnostic/therapeutic carpal tunnel injections in 2020 and got about 6 months of relief but now symptoms of numbness and tingling are returning.  This is most notable to the long ring and small fingers bilaterally.  He has pain that radiates up the forearms into the elbow and occasionally into the upper arm as well.  He had EMG done in 2021 but never returned to go over the results.  He is having increasing numbness and tingling, weakness, sensation of clumsiness and dropping things.  He is having nighttime wakefulness from sleep with little relief from bracing.  He is here today inquiring about surgical options.  He is otherwise healthy.       Review of Systems:   GENERAL: Negative  GI: Negative  MUSCULOSKELETAL: See HPI  SKIN: Negative  NEURO:  Negative     Physical Exam:  GENERAL:  Alert and oriented to person, place, and time.  No acute distress and breathing comfortably; pleasant and cooperative with the examination.  HEENT:  Head is normocephalic and atraumatic.  NECK:  Supple, no visible swelling.  CARDIOVASCULAR:  No palpable tachycardia.  LUNGS:  No audible wheezing or labored breathing.  ABDOMEN:  Nondistended.  Extremities: Evaluation of bilateral upper extremities finds the patient to have a palpable radial artery at the wrist with brisk capillary refill to all digits. The patient has intact sensorium to axillary, radial, median and ulnar nerves. There are no open wounds. There are no signs of infection. There is no evidence of  lymphedema or lymphatic streaking. The patient has supple compartments of the bilateral arms, forearms and hands.  Positive Tinel's over the median nerves of bilateral wrist.  Positive Phalen's and direct compression maneuver.  He has tenderness over the medial condyles bilaterally with positive Tinel's over the ulnar nerve to the medial elbows.     Imaging/Test Results:  EMG from 2021 showed evidence of right cubital tunnel syndrome at that time.     Assessment:  Bilateral carpal tunnel syndrome and bilateral cubital tunnel syndrome, recalcitrant to nonoperative treatment strategies and slightly worse in the right comparison to the left.     Plan:  Treatment options were discussed including both operative and non-operative treatment strategies.  Patient elects to proceed forth with surgery of on the right by way of right cubital tunnel release with possible anterior transposition with concomitant carpal tunnel release under general anesthesia.  On the left he elects for continued nonoperative management in favor of repeat Kenalog injection for temporizing relief, he is thoughtfully considering surgery in the future however.  Risks, benefits, and alternatives to surgery were discussed including, but not limited to infection risk, persistent or incomplete relief of pain, swelling, or stiffness, and post-operative pain and discomfort.  The patient verbalized agreement and understanding of the plan for care.  All questions answered at today's visit.  Plan for follow-up 10-14 days post-op.          Hand / UE Inj/Asp: L carpal tunnel for carpal tunnel syndrome on 5/20/2025 3:25 PM  Indications: diagnostic and therapeutic  Details: 25 G needle, volar approach  Medications: 10 mg triamcinolone acetonide 10 mg/mL; 1 mL lidocaine 10 mg/mL (1 %)  Outcome: tolerated well, no immediate complications    Left Carpal Tunnel Injection: It was explained to the patient that the risks of a steroid injection include but are not limited  to infection, local skin irritation, skin atrophy, calcification, continued pain and discomfort, elevated blood sugar, burning, failure to relieve pain, and possible late infection. The patient verbalized good insight and verbalized consent for the injection. It was further explained that the post-injection discomfort can be alleviated with additional medications, ice, elevation, and rest over the first 24 hours, and that these modalities are recommended.  After informed consent was provided, patient identification was confirmed, and allergies were verified, the patient was appropriately positioned. The site was marked and time-out performed.    Using aseptic technique, a solution containing 1 mL of 10 mg of Kenalog and 1 mL of 1% lidocaine without epinephrine was injected into the patient's left carpal tunnel. A 25-gauge needle was inserted just ulnar to the anatomic course of the palmaris longus tendon at the level of the distal wrist flexion crease. It was slowly advanced deep to the distal antebrachial fascia. The solution was then injected slowly, taking care to ensure that the patient experienced no paresthesias during the injection of the solution. After injection of the solution, the patient was asked to perform active flexion and extension of the digits and wrist to help disperse the solution. A band-aid was then placed at the injection site. The patient tolerated this left carpal tunnel injection well.      Procedure, treatment alternatives, risks and benefits explained, specific risks discussed. Consent was given by the patient. Immediately prior to procedure a time out was called to verify the correct patient, procedure, equipment, support staff and site/side marked as required. Patient was prepped and draped in the usual sterile fashion.         In a face to face encounter, I performed a history and physical examination, discussed pertinent diagnostic studies if indicated, and discussed diagnosis and  management strategies with both the patient and the mid-level provider. I reviewed the mid-level's note and agree with the documented findings and plan of care.  Patient presents today for evaluation of symptoms compatible with bilateral carpal tunnel syndrome and bilateral cubital tunnel syndrome.  Positive Tinel's over course of median nerve to bilateral wrist and ulnar nerve to bilateral elbows.  Symptoms much worse on the right as compared to the left.  Treatment options were discussed including operative and nonoperative strategies.  Patient has failed nonoperative treatment strategies.  Symptoms are worsening.  Symptoms that wake from sleep at nighttime every night.  He elects to forego any additional nonoperative measures on the right in favor of right carpal tunnel release with right cubital tunnel release with possible anterior transposition.  On the left he elects for left carpal tunnel injection.  Plan for surgery under general anesthesia.

## 2025-06-12 ENCOUNTER — APPOINTMENT (OUTPATIENT)
Dept: ORTHOPEDIC SURGERY | Facility: CLINIC | Age: 35
End: 2025-06-12
Payer: COMMERCIAL

## 2025-06-13 ENCOUNTER — OFFICE VISIT (OUTPATIENT)
Dept: INTERNAL MEDICINE | Age: 35
End: 2025-06-13

## 2025-06-13 VITALS — WEIGHT: 135 LBS | BODY MASS INDEX: 23.92 KG/M2 | HEIGHT: 63 IN

## 2025-06-13 DIAGNOSIS — Z02.89 MEDICATION MANAGEMENT CONTRACT SIGNED: ICD-10-CM

## 2025-06-13 DIAGNOSIS — F90.2 ATTENTION DEFICIT HYPERACTIVITY DISORDER (ADHD), COMBINED TYPE: Primary | ICD-10-CM

## 2025-06-13 RX ORDER — DEXTROAMPHETAMINE SACCHARATE, AMPHETAMINE ASPARTATE MONOHYDRATE, DEXTROAMPHETAMINE SULFATE AND AMPHETAMINE SULFATE 2.5; 2.5; 2.5; 2.5 MG/1; MG/1; MG/1; MG/1
10 CAPSULE, EXTENDED RELEASE ORAL DAILY
Qty: 30 CAPSULE | Refills: 0 | Status: SHIPPED | OUTPATIENT
Start: 2025-06-13 | End: 2025-07-13

## 2025-06-13 RX ORDER — IBUPROFEN 200 MG
200 TABLET ORAL EVERY 6 HOURS PRN
COMMUNITY

## 2025-06-13 SDOH — ECONOMIC STABILITY: FOOD INSECURITY: WITHIN THE PAST 12 MONTHS, YOU WORRIED THAT YOUR FOOD WOULD RUN OUT BEFORE YOU GOT MONEY TO BUY MORE.: NEVER TRUE

## 2025-06-13 SDOH — ECONOMIC STABILITY: FOOD INSECURITY: WITHIN THE PAST 12 MONTHS, THE FOOD YOU BOUGHT JUST DIDN'T LAST AND YOU DIDN'T HAVE MONEY TO GET MORE.: NEVER TRUE

## 2025-06-13 ASSESSMENT — PATIENT HEALTH QUESTIONNAIRE - PHQ9
1. LITTLE INTEREST OR PLEASURE IN DOING THINGS: NOT AT ALL
SUM OF ALL RESPONSES TO PHQ QUESTIONS 1-9: 0
2. FEELING DOWN, DEPRESSED OR HOPELESS: NOT AT ALL
SUM OF ALL RESPONSES TO PHQ QUESTIONS 1-9: 0

## 2025-06-13 NOTE — PROGRESS NOTES
[facility]  [address] [address]  Dept: [phone]  Dept Fax: [phone]  Loc: [phone]     Chief Complaint  Chief Complaint   Patient presents with    ADHD     Diagnosed as a kid with ADHD, quit drinking and now his ADHD is off the wall and it's affecting his life and marriage.        HPI:  35 y.o.male who presents for the following:    History of Present Illness  The patient is a 35-year-old male who presents for ADHD.    He has been abstaining from alcohol for the past 137 days, during which time he has experienced a significant exacerbation of his ADHD symptoms now. This has negatively impacted his relationship with his wife. He reports difficulty maintaining focus during conversations and experiences frustration over minor issues. His job, which alternates between periods of high physical activity and sedentary work, is challenging due to his inability to remain still. His ability to complete tasks is contingent on his level of interest, and he struggles with organization during task execution. He frequently forgets appointments and obligations but does not procrastinate on tasks that require immediate attention. He exhibits constant fidgeting and restlessness and feels an overwhelming urge to be active throughout the day. He believes he has a combination of inattentive and hyperactive ADHD.    He has lost weight due to intermittent fasting, eating once a day, and consuming large quantities of food at once, which he did for 8 to 9 months, reducing his weight from 180-190 pounds to around 120 pounds.  In 2002 and prior, he was prescribed Adderall, Ritalin, Strattera, and another medication, the name of which he cannot recall. These medications were administered in the morning, kept him awake during school hours, and caused him to sleep post-school, leading to nocturnal wakefulness. He does not

## 2025-06-14 ASSESSMENT — ENCOUNTER SYMPTOMS
COUGH: 0
VOMITING: 0
RHINORRHEA: 0
SORE THROAT: 0
WHEEZING: 0
TROUBLE SWALLOWING: 0
DIARRHEA: 0
SHORTNESS OF BREATH: 0
NAUSEA: 0

## 2025-06-15 LAB

## 2025-06-16 ENCOUNTER — RESULTS FOLLOW-UP (OUTPATIENT)
Dept: FAMILY MEDICINE CLINIC | Age: 35
End: 2025-06-16

## 2025-06-19 ENCOUNTER — APPOINTMENT (OUTPATIENT)
Dept: ORTHOPEDIC SURGERY | Facility: CLINIC | Age: 35
End: 2025-06-19
Payer: COMMERCIAL

## 2025-07-22 ENCOUNTER — OFFICE VISIT (OUTPATIENT)
Dept: INTERNAL MEDICINE | Age: 35
End: 2025-07-22
Payer: COMMERCIAL

## 2025-07-22 VITALS
OXYGEN SATURATION: 99 % | HEART RATE: 82 BPM | TEMPERATURE: 97.3 F | HEIGHT: 65 IN | BODY MASS INDEX: 22.06 KG/M2 | DIASTOLIC BLOOD PRESSURE: 84 MMHG | WEIGHT: 132.4 LBS | SYSTOLIC BLOOD PRESSURE: 126 MMHG

## 2025-07-22 DIAGNOSIS — Z00.00 ANNUAL PHYSICAL EXAM: ICD-10-CM

## 2025-07-22 DIAGNOSIS — F90.2 ATTENTION DEFICIT HYPERACTIVITY DISORDER (ADHD), COMBINED TYPE: ICD-10-CM

## 2025-07-22 DIAGNOSIS — F90.2 ATTENTION DEFICIT HYPERACTIVITY DISORDER (ADHD), COMBINED TYPE: Primary | ICD-10-CM

## 2025-07-22 PROCEDURE — 99395 PREV VISIT EST AGE 18-39: CPT | Performed by: NURSE PRACTITIONER

## 2025-07-22 PROCEDURE — 99214 OFFICE O/P EST MOD 30 MIN: CPT | Performed by: NURSE PRACTITIONER

## 2025-07-22 RX ORDER — DEXTROAMPHETAMINE SACCHARATE, AMPHETAMINE ASPARTATE MONOHYDRATE, DEXTROAMPHETAMINE SULFATE AND AMPHETAMINE SULFATE 3.75; 3.75; 3.75; 3.75 MG/1; MG/1; MG/1; MG/1
15 CAPSULE, EXTENDED RELEASE ORAL DAILY
Qty: 30 CAPSULE | Refills: 0 | Status: SHIPPED | OUTPATIENT
Start: 2025-07-22 | End: 2025-08-21

## 2025-07-22 ASSESSMENT — ENCOUNTER SYMPTOMS
COUGH: 0
NAUSEA: 0
SHORTNESS OF BREATH: 0
VOMITING: 0
DIARRHEA: 0
RHINORRHEA: 0
WHEEZING: 0
SORE THROAT: 0
TROUBLE SWALLOWING: 0

## 2025-07-22 NOTE — PROGRESS NOTES
[facility] [place] [facility] [place]  [facility]  [address] [id number]  Dept: [phone]  Dept Fax: [phone]  Loc: [phone]     Chief Complaint  Chief Complaint   Patient presents with    Annual Exam    ADHD     1 month follow up.        HPI:  35 y.o.male who presents for the following:    History of Present Illness  The patient is a 35-year-old male presenting for an annual physical and ADHD follow-up.    His medication has effectively managed frustration levels, particularly with his children and wife. He has been off the medication since 07/12/2025 due to a positive marijuana drug test. During the initial three days without medication, he experienced increased frustration and had to consciously remind himself to eat. His appetite typically returns between noon and 2 PM. He has lost significant weight prior to the start of the med by lifestyle changes. He is considering increasing the dosage as he did not notice a significant improvement in his attention span. He reports no symptoms of depression or anxiety. He is ready for another drug test and has ceased using marijuana. He finds it challenging to determine the optimal time to take his medication as he wakes up at 3:30 AM and feels sluggish by the end of his workday if taken too early. He works as a shaft worker in a mine, involving heights. He plans to start exercising regularly to help with his back pain. He recently experienced back pain lasting a week. He continues to vape tobacco. He reports no abdominal pain.       6/13 started Adderall for ADHD      Smoker:vape tobacco   Organzied exercise:none   Alcohol:sober 175 days  Job: worker in a mine     Coming off the medication was a lot of frustration   Can tell helping       Review of Systems   Constitutional:  Negative for appetite change, chills, fatigue and fever.   HENT:  Negative for congestion, ear pain, rhinorrhea, sore

## 2025-07-26 LAB

## 2025-08-18 DIAGNOSIS — F90.2 ATTENTION DEFICIT HYPERACTIVITY DISORDER (ADHD), COMBINED TYPE: ICD-10-CM

## 2025-08-18 RX ORDER — DEXTROAMPHETAMINE SACCHARATE, AMPHETAMINE ASPARTATE MONOHYDRATE, DEXTROAMPHETAMINE SULFATE AND AMPHETAMINE SULFATE 3.75; 3.75; 3.75; 3.75 MG/1; MG/1; MG/1; MG/1
15 CAPSULE, EXTENDED RELEASE ORAL DAILY
Qty: 30 CAPSULE | Refills: 0 | Status: SHIPPED | OUTPATIENT
Start: 2025-08-18 | End: 2025-09-17